# Patient Record
Sex: FEMALE | Race: WHITE | URBAN - METROPOLITAN AREA
[De-identification: names, ages, dates, MRNs, and addresses within clinical notes are randomized per-mention and may not be internally consistent; named-entity substitution may affect disease eponyms.]

---

## 2018-02-13 ENCOUNTER — HOSPITAL ENCOUNTER (OUTPATIENT)
Dept: CARDIAC REHAB | Age: 68
Discharge: HOME | End: 2018-02-28
Attending: INTERNAL MEDICINE | Admitting: INTERNAL MEDICINE

## 2018-02-13 RX ORDER — DOCUSATE CALCIUM 240 MG
CAPSULE ORAL
COMMUNITY

## 2018-02-13 RX ORDER — ASPIRIN 81 MG/1
81 TABLET ORAL
COMMUNITY
Start: 2017-12-01

## 2018-02-13 RX ORDER — WARFARIN SODIUM 10 MG/1
TABLET ORAL
COMMUNITY
Start: 2018-01-25

## 2018-02-13 RX ORDER — OYSTER SHELL CALCIUM WITH VITAMIN D 500; 200 MG/1; [IU]/1
1 TABLET, FILM COATED ORAL
COMMUNITY

## 2018-02-13 RX ORDER — ATORVASTATIN CALCIUM 40 MG/1
40 TABLET, FILM COATED ORAL
COMMUNITY
Start: 2018-01-25

## 2018-02-13 RX ORDER — CARVEDILOL 3.12 MG/1
3.12 TABLET ORAL
COMMUNITY
Start: 2017-12-01

## 2018-02-13 RX ORDER — SODIUM PHOSPHATE,MONO-DIBASIC 19G-7G/118
2 ENEMA (ML) RECTAL
COMMUNITY

## 2018-02-13 RX ORDER — MULTIVIT WITH MINERALS/LUTEIN
1000 TABLET ORAL
COMMUNITY
Start: 2014-01-21

## 2018-02-13 NOTE — PROGRESS NOTES
of pain at this time                    [] Angina/chest pain Rates:    Relief with:       []  Incisional Pain Rates:      Relief with:        []  Muscle / Joint / Arthritic    Rates:     Relief with:         []  Leg Pain     Rates:    Relief with:         []  Other        Fall Risk Assessment:  Falls in the last 3 months  [] yes [x]  no     []  Alzheimers Disease [] Cognitive Impairment      [] Balance/Gait Disturbance  []  Fall History      []  Visual impairment uncorrected []  Dizziness []  Uses a cane or walker    []  Other     [x]  Fall safety issues reviewed    Physical/behavioral signs of abuse/neglect  [x] no    []  yes      Do you feel safe at home   []  no    [x]  yes    Advanced Directives        [x] no   []  yes   []  Pt given Advanced Directive pack            Individual Cardiac Treatment Plan EXERCISE  EXERCISE  ASSESSMENT/PLAN EXERCISE  REASSESSMENT  30 day EXERCISE   REASSESSMENT  60 day EXERCISE  REASSESSMENT  90 day EXERCISE  DISCHARGE/FOLLOW-UP   DATE: 12/13/2018 DATE:  DATE:  DATE:  DATE:    EXERCISE ASSESSMENT EXERCISE ASSESSMENT EXERCISE ASSESSMENT EXERCISE  ASSESSMENT EXERCISE REASSESSMENT   6 Min Walk Test  Distance walked: 540 feet   1.0 mph  METs: 1.8  Max HR:120 BPM      RPE:  13    Rhythm: SR/ST      6 Min. Walk Test  Distance walked:       feet  Mets:  Max. HR.      BPM  RPE:   Rhythm:  % changed:     Fall Risk/Other  Fall risk assessed (x)yes   Issue:none   Orthopedic problems ()yes (x)no  Walker () Cane()   Safety issues reviewed  (x)yes   If patient has balance or orthopedic issue, action:      EXERCISE PLAN Exercise Plan EXERCISE PLAN EXERCISE PLAN EXERCISE PLAN   Interventions Interventions Interventions Interventions Interventions   Exercise Prescription/Order   Exercise Prescription/Order Exercise Prescription/order Exercise   Prescription/Order Discharge Exercise Plan                Mode       1. TM at 1.0-1.2 mph for 5-10  min at 1.6 mets    2. NS at 1/35  for 10-15 min at 1.8 mets    3. UBE at 0-5  for 10 min      No upper body exercise until 2- due to sternal precautions    Mode      TM  NS  Ellipt/Arc  Bike  UBE  Scifit     Mode      TM  NS  Ellipt/Arc  Bike  UBE  Scifit     Mode      TM  NS  Ellipt/Arc  Bike  UBE  Scifit   Continue independent exercise [] Y    Continue in Phase IV [] Y    Aerobic Mode:     Frequency: 2-3  Week  Duration: 15-30 min  Intensity:   RPE 11-12     Frequency:   3 x week   Duration: 20-30 min  Intensity:   THR ___or RPE 11-13     Frequency: 3 x week  Duration: 20-40 min. Intensity:   THR ___ or RPE 11-14     Frequency: 3 x week  Duration: 30-45 min  Intensity:  THR ___or RPE 11-14 Frequency:      Exercise 3-5 days per week. [] yes[] no  []   30+ min. Of exercise per session    [] yes [] no     Progression:  Depending on patient condition, time and intensity will be increased. An initial met level< 3 is classified as \"light\". Progression to \"moderate\" 3-6 mets or higher for patients in better physical condition. Resistance Training  [x] yes  [] no         Max. Met level:    Session #:    Resistance Training  [] yes  [] no  Type:    Symptoms with Exercise[] yes [] no Max. Met Level:    Session#:    Resistance Training  [] yes [] no  Type:    Symptoms with Exercise[] yes[] no   Max. Met. Level:    Session #:    Resistance Training  [] yes [] no  Type:    Symptoms with Exercise [] yes [] no Max. Met level:    Sessions#:    Home Resistance Training [] Y[] N  Type:   Home Exercise Plan and Goals  [facility] plans:  Exercise (x)yes ()no  Frequency: 2 times per week   Duration:5-10 minutes and progress   Mode:walking in her house     Discharge Goal:  30+ min.  Of aerobic exercise 3-5 days/week       Home Exercise Goal  Reassessed  Exercising [] yes[] no  Frequency:  Duration:  Mode:         Home Exercise Goal Reassessed  Exercising [] yes [] no  Frequency:  Duration:  Mode:       Home Exercise Goal Reassessed  Exercising [] yes [] no  Frequency:  Duration:  Mode:           See above plan   Education Plan Education Plan Education Plan Education Plan Education Completed   Orientation to:  [x] Y [] N Rehab/Routine  [x] Y[] N RPE Scale  [x] Y [] N Exercise Safety  [x] Y [] N   S/S to Report  [x] Y [] N Infection Control      Understand/Review  [] Y [] N RPE Scale  [] Y [] N Exercise Safety  [] Y [] N Equipment Orientation  [] Y [] N S/S to Report  [] Y [] N Warm Up/Cool Down      Attends Ed Class:  []  Benefits of Exercise   []   Resistance Training 101  []   Benefits of Cardiac Rehab    [] Patient is competent with stretches/equipment  []  Patient continues to need assist with equipment/routine        Attends Ed. Class  []  Benefits of Exercise  []   Resistance Training 101  []  Benefits of Cardiac Rehab                                Attends Ed.  Class:  []   Benefits of Exercise   []  Resistance Training 101  []   Benefits of Cardiac Rehab    []  Y  Knows when not to exercise  []  Y Completed all 3  Education classes       Individual Cardiac Treatment Plan  Nutrition  NUTRITION  ASSESSMENT/PLAN NUTRITION  REASSESSMENT NUTRITION   REASSESSMENT NUTRITION   REASSESSMENT NUTRITION  DISCHARGE/FOLLOW-UP   NUTRITION ASSESSMENT NUTRITION REASSESSMENT NUTRITION REASSESSMENT NUTRITION   REASSESSMENT NUTRITION REASSESSMENT   Weight Management  Weight: 310.0   Height: 5'7''   BMI: 48.5   [] Normal  [] Overweight (x)Obese    [] Recent gain:    [x] Recent loss: 18 pounds after surgery   Patients goal weight: under 300 pounds      Weight Management  Weight:                         Weight Management  Weight:                       Weight   Management  Weight:         Weight Management  Weight:                  Height:    BMI:    Diet  Current Diet: regular       Diet  Dietary Improvements:   Diabetes:   [] Y  [x] N    FBS NA   HgA1c NA    Checks BS at home   [] Y  [x] N  Frequency NA  Range NA  Medications NA  Low BS Reaction NA     []  To check BS first 6

## 2018-03-01 ENCOUNTER — HOSPITAL ENCOUNTER (OUTPATIENT)
Dept: OTHER | Age: 68
Discharge: HOME | End: 2018-03-31
Attending: INTERNAL MEDICINE | Admitting: INTERNAL MEDICINE

## 2018-04-01 ENCOUNTER — HOSPITAL ENCOUNTER (OUTPATIENT)
Dept: OTHER | Age: 68
Discharge: HOME | End: 2018-04-30
Attending: INTERNAL MEDICINE | Admitting: INTERNAL MEDICINE